# Patient Record
Sex: FEMALE
[De-identification: names, ages, dates, MRNs, and addresses within clinical notes are randomized per-mention and may not be internally consistent; named-entity substitution may affect disease eponyms.]

---

## 2023-01-24 ENCOUNTER — NURSE TRIAGE (OUTPATIENT)
Dept: OTHER | Facility: CLINIC | Age: 63
End: 2023-01-24

## 2023-01-24 NOTE — TELEPHONE ENCOUNTER
Location of patient: [place]    Subjective: Caller states that she has a history of hypertension and has increased blood pressure for the last few days. She states that she has spoken with her provider and was told to double her medication and be seen in the office on WED. Has taken medication as  stated by provider but blood pressure getting higher. Current Symptoms: elevated blood pressure    Onset: 7 hours ago; worsening    Associated Symptoms:  chest discomfort but no pain    Pain Severity: Does not endorse pain at this time. What has been tried: taken a double dose of blood pressure medication per MD.     Recommended disposition: Go to ED Now    Care advice provided, patient verbalizes understanding; denies any other questions or concerns; instructed to call back for any new or worsening symptoms. Patient/caller agrees to proceed to nearest Emergency Department    This triage is a result of a call to [address] [place] [address]. Please do not respond to the triage nurse through this encounter. Any subsequent communication should be directly with the patient.     Reason for Disposition   [2] Systolic BP  >= 862 OR Diastolic >= 308 AND [6] cardiac or neurologic symptoms (e.g., chest pain, difficulty breathing, unsteady gait, blurred vision)    Protocols used: Blood Pressure - High-ADULT-